# Patient Record
Sex: FEMALE
[De-identification: names, ages, dates, MRNs, and addresses within clinical notes are randomized per-mention and may not be internally consistent; named-entity substitution may affect disease eponyms.]

---

## 2023-09-27 ENCOUNTER — RX ONLY (OUTPATIENT)
Age: 48
Setting detail: RX ONLY
End: 2023-09-27

## 2023-11-27 ENCOUNTER — APPOINTMENT (RX ONLY)
Dept: URBAN - METROPOLITAN AREA CLINIC 129 | Facility: CLINIC | Age: 48
Setting detail: DERMATOLOGY
End: 2023-11-27

## 2023-11-27 DIAGNOSIS — L30.9 DERMATITIS, UNSPECIFIED: ICD-10-CM

## 2023-11-27 PROCEDURE — ? PRESCRIPTION

## 2023-11-27 PROCEDURE — 99203 OFFICE O/P NEW LOW 30 MIN: CPT

## 2023-11-27 PROCEDURE — ? COUNSELING

## 2023-11-27 RX ORDER — DESONIDE 0.5 MG/G
CREAM TOPICAL BID
Qty: 60 | Refills: 5 | Status: ERX | COMMUNITY
Start: 2023-11-27

## 2023-11-27 RX ADMIN — DESONIDE: 0.5 CREAM TOPICAL at 00:00

## 2023-11-27 ASSESSMENT — LOCATION DETAILED DESCRIPTION DERM
LOCATION DETAILED: LEFT SUPERIOR MEDIAL FOREHEAD
LOCATION DETAILED: RIGHT INFERIOR CENTRAL MALAR CHEEK
LOCATION DETAILED: LEFT INFERIOR CENTRAL MALAR CHEEK

## 2023-11-27 ASSESSMENT — LOCATION SIMPLE DESCRIPTION DERM
LOCATION SIMPLE: RIGHT CHEEK
LOCATION SIMPLE: LEFT CHEEK
LOCATION SIMPLE: LEFT FOREHEAD

## 2023-11-27 ASSESSMENT — LOCATION ZONE DERM: LOCATION ZONE: FACE

## 2023-12-04 ENCOUNTER — APPOINTMENT (RX ONLY)
Dept: URBAN - METROPOLITAN AREA CLINIC 129 | Facility: CLINIC | Age: 48
Setting detail: DERMATOLOGY
End: 2023-12-04

## 2023-12-04 DIAGNOSIS — L25.1 UNSPECIFIED CONTACT DERMATITIS DUE TO DRUGS IN CONTACT WITH SKIN: ICD-10-CM

## 2023-12-04 DIAGNOSIS — L23.9 ALLERGIC CONTACT DERMATITIS, UNSPECIFIED CAUSE: ICD-10-CM

## 2023-12-04 PROCEDURE — ? PATCH TESTING

## 2023-12-04 PROCEDURE — 95044 PATCH/APPLICATION TESTS: CPT

## 2023-12-04 ASSESSMENT — LOCATION ZONE DERM: LOCATION ZONE: TRUNK

## 2023-12-04 ASSESSMENT — LOCATION DETAILED DESCRIPTION DERM: LOCATION DETAILED: INFERIOR THORACIC SPINE

## 2023-12-04 ASSESSMENT — LOCATION SIMPLE DESCRIPTION DERM: LOCATION SIMPLE: UPPER BACK

## 2023-12-04 NOTE — PROCEDURE: PATCH TESTING
Problem: Pain:  Goal: Pain level will decrease  Description: Pain level will decrease  Outcome: Ongoing  Goal: Control of acute pain  Description: Control of acute pain  Outcome: Ongoing  Goal: Control of chronic pain  Description: Control of chronic pain  Outcome: Ongoing     Problem:  Bowel/Gastric:  Goal: Bowel function will improve  Description: Bowel function will improve  Outcome: Ongoing  Goal: Occurrences of vomiting will decrease  Description: Occurrences of vomiting will decrease  Outcome: Ongoing     Problem: Fluid Volume:  Goal: Maintenance of adequate hydration will improve  Description: Maintenance of adequate hydration will improve  Outcome: Ongoing
Number Of Patches (Maximum Allowable Per Dos By Cms Is 90): 80
Detail Level: None
Post-Care Instructions: I reviewed with the patient in detail post-care instructions. Patient should not sweat, pick at, or get the patches wet for 48 hours.
Consent: Written consent obtained, risks reviewed including but not limited to rash, itching, allergic reaction, systemic rash, remote possiblity of anaphylaxis to allergen.

## 2023-12-04 NOTE — HPI: TESTING (PATCH TESTING)
Has Your Rash Been Biopsied Before?: has not been biopsied previously
previous_patch_test_[place]_series
How Severe Is Your Rash At Its Worst?: moderate

## 2023-12-06 ENCOUNTER — APPOINTMENT (RX ONLY)
Dept: URBAN - METROPOLITAN AREA CLINIC 129 | Facility: CLINIC | Age: 48
Setting detail: DERMATOLOGY
End: 2023-12-06

## 2023-12-06 DIAGNOSIS — L23.9 ALLERGIC CONTACT DERMATITIS, UNSPECIFIED CAUSE: ICD-10-CM

## 2023-12-06 PROCEDURE — 99212 OFFICE O/P EST SF 10 MIN: CPT

## 2023-12-06 PROCEDURE — ? CORE ACDS PATCH TEST READING

## 2023-12-06 PROCEDURE — ? COUNSELING

## 2023-12-06 NOTE — PROCEDURE: CORE ACDS PATCH TEST READING
Name Of Allergen 49: D/L-?-Tocopherol 100%
Name Of Allergen 80: Benzyl alcohol 10% soft
Allergen 39 Reaction: no reaction
Name Of Allergen 54: Chloroxylenol (PCMX) 1% pet
Name Of Allergen 62: Sodium benzoate 5% pet
Name Of Allergen 6: Fragrance mix I 8% pet
Name Of Allergen 14: Epoxy resin 1% pet
Name Of Allergen 22: Mercapto mix 1% pet
Name Of Allergen 1: Nickel sulfate 2.5% pet
What Reading Time Point?: 48 hour
Name Of Allergen 64: Jany 2% pet
Name Of Allergen 40: Cocamidopropyl betaine 1% aq
Name Of Allergen 19: Methyldibromoglutaronitrile 0.5% pet
Name Of Allergen 27: Tixocortol-21-pivalate
Name Of Allergen 69: Cetyl steryl alcohol 20% pet
Name Of Allergen 46: Methyl methacrylate 2% pet
Name Of Allergen 37: Propylene glycol 100% aq
Name Of Allergen 77: Benzoic acid 5% pet
Name Of Allergen 59: 4-Chloro-3-cresol ([facility]) 1% pet
Name Of Allergen 24: Thiuram mix 1% pet
Name Of Allergen 32: Mercaptobenzothiazole 1% pet
Name Of Allergen 11: Ethylenediamine dihydrochloride 1% pet
Name Of Allergen 60: Benzalkonium chloride 0.1% aq
Name Of Allergen 7: Colophony 20% pet
Name Of Allergen 70: Ethylhexylglycerin 5% pet
Name Of Allergen 38: Iodopropynyl butylcarbamate 0.1% pet
Name Of Allergen 4: Potassium dichromate 0.25% pet
Name Of Allergen 12: Cobalt chloride 1% pet
Name Of Allergen 20: p-Phenylenediamine 1% pet
Number Of Patches Read: 80
Name Of Allergen 52: Chlorhexidine digluconate 0.5% aq
Name Of Allergen 30: Budesonide 0.1% pet
Name Of Allergen 25: Diazolidinyl urea 1% pet
Name Of Allergen 75: Phenoxyethanol 1% pet
Name Of Allergen 17: Tetracaine hydrochloride 5% pet
Name Of Allergen 35: Disperse blue 106/124 mix 1% pet
Name Of Allergen 44: Oleamidopropyl dimethylamine 0.1% aq
Name Of Allergen 57: Sesquiterpene lactone mix 0.1% pet
Name Of Allergen 72: Clobetasol-17-propionate 1% pet
Name Of Allergen 9: Methylisothiazolinone 0.2% aq
Name Of Allergen 67: Sorbitan sesquioleate 20% pet
Name Of Allergen 26: Benzocaine 5% pet
Name Of Allergen 76: Disperse Orange 3 1% pet
Name Of Allergen 58: Cocamide TODD 0.5% pet
Name Of Allergen 50: Ethyl acrylate 0.1% pet
Name Of Allergen 10: Balsam of [place] 25% pet
Name Of Allergen 68: n,n-Diphenylguanidine 1% pet
Name Of Allergen 36: Lidocaine 15% pet
Name Of Allergen 31: Hydrocortisone-17-butyrate 1% pet
Name Of Allergen 45: Decyl glucoside 5% pet
Name Of Allergen 23: 2-Bromo-2-nitropropane-1,3-diol 0.5% pet
Name Of Allergen 41: Mixed dialkyl thioureas 1% pet
Name Of Allergen 65: Compositae mix II 5% pet
Name Of Allergen 2: Lanolin alcohol (Amerchol 101) 50% pet
Name Of Allergen 42: 3-(Dimethylamino)-propylamine 1% aq
Name Of Allergen 55: 2-Hydroxy-4-methoxybenzophenone (benzophenone-3) 10% pet
Name Of Allergen 47: Lavender absolute 2% pet
Name Of Allergen 78: 2,6-Ditert-butyl-4-cresol (BHT) 2% pet
Name Of Allergen 73: Amidoamine 0.1% aq
Name Of Allergen 15: Carba mix 3% pet
Detail Level: Zone
Name Of Allergen 28: Gold sodium thiosulfate 2% pet
Name Of Allergen 33: Bacitracin 20% pet
Name Of Allergen 3: Neomycin 20% pet
Name Of Allergen 51: Tea tree oil 5% pet
Name Of Allergen 16: Black rubber mix 0.6% pet
Name Of Allergen 66: Ethyleneurea melamine-formaldehyde 5% pet
Name Of Allergen 61: 2-Hydroxy-4-methoxybenzophenone-5-sulfonic acid (benzophenone-4) 2% pet
Name Of Allergen 43: Hydroxyethyl methacrylate 2% pet
Name Of Allergen 74: Ethyl cyanoacrylate 10% pet
Name Of Allergen 56: Tosylamide formaldehyde resin 10% pet
Name Of Allergen 13: s-rqgb-[id number] formaldehyde resin 1% pet
Name Of Allergen 29: Imidazolidinyl urea 2% pet
Show Negative Results In The Note?: Yes
Name Of Allergen 8: Paraben mix 12% pet
Name Of Allergen 21: Formaldehyde 2% aq
Name Of Allergen 34: Fragrance mix II 14% pet
Name Of Allergen 79: 2-Ethylhexyl-4-methoxycinnamate 10% pet
Name Of Allergen 63: Sorbic acid 2% pet
Name Of Allergen 48: Cinnamic aldehyde 1% pet
Name Of Allergen 18: Quaternium 15 2% pet
Name Of Allergen 5: DMDM hydantoin 1% pet
Name Of Allergen 71: Triamcinolone 1% pet
Show Allergen Counseling In The Note?: No
Name Of Allergen 39: Polymyxin B sulfate 3% pet
Name Of Allergen 53: Propolis 10% pet

## 2023-12-08 ENCOUNTER — APPOINTMENT (RX ONLY)
Dept: URBAN - METROPOLITAN AREA CLINIC 129 | Facility: CLINIC | Age: 48
Setting detail: DERMATOLOGY
End: 2023-12-08

## 2023-12-08 DIAGNOSIS — L23.9 ALLERGIC CONTACT DERMATITIS, UNSPECIFIED CAUSE: ICD-10-CM

## 2023-12-08 PROCEDURE — ? COUNSELING

## 2023-12-08 PROCEDURE — ? CORE ACDS PATCH TEST READING

## 2023-12-08 PROCEDURE — 99214 OFFICE O/P EST MOD 30 MIN: CPT

## 2023-12-08 NOTE — PROCEDURE: CORE ACDS PATCH TEST READING
Name Of Allergen 61: 2-Hydroxy-4-methoxybenzophenone-5-sulfonic acid (benzophenone-4) 2% pet
Allergen 50 Reaction: no reaction
Name Of Allergen 56: Tosylamide formaldehyde resin 10% pet
Name Of Allergen 3: Neomycin 20% pet
Name Of Allergen 46: Methyl methacrylate 2% pet
Name Of Allergen 21: Formaldehyde 2% aq
Name Of Allergen 16: Black rubber mix 0.6% pet
What Reading Time Point?: 48 hour
Name Of Allergen 74: Ethyl cyanoacrylate 10% pet
Allergen 29 Reaction: irritant reaction
Name Of Allergen 66: Ethyleneurea melamine-formaldehyde 5% pet
Name Of Allergen 51: Tea tree oil 5% pet
Name Of Allergen 39: Polymyxin B sulfate 3% pet
Name Of Allergen 79: 2-Ethylhexyl-4-methoxycinnamate 10% pet
Detail Level: Zone
Name Of Allergen 13: j-[id number]-[id number] formaldehyde resin 1% pet
Name Of Allergen 26: Benzocaine 5% pet
Name Of Allergen 8: Paraben mix 12% pet
Name Of Allergen 5: DMDM hydantoin 1% pet
Number Of Patches Read: 80
Name Of Allergen 36: Lidocaine 15% pet
Name Of Allergen 18: Quaternium 15 2% pet
Name Of Allergen 71: Triamcinolone 1% pet
Name Of Allergen 31: Hydrocortisone-17-butyrate 1% pet
Name Of Allergen 58: Cocamide TODD 0.5% pet
Name Of Allergen 23: 2-Bromo-2-nitropropane-1,3-diol 0.5% pet
Name Of Allergen 63: Sorbic acid 2% pet
Name Of Allergen 48: Cinnamic aldehyde 1% pet
Name Of Allergen 27: Tixocortol-21-pivalate
Name Of Allergen 67: Sorbitan sesquioleate 20% pet
Name Of Allergen 6: Fragrance mix I 8% pet
Name Of Allergen 1: Nickel sulfate 2.5% pet
Show Allergen Counseling In The Note?: No
Name Of Allergen 44: Oleamidopropyl dimethylamine 0.1% aq
Name Of Allergen 32: Mercaptobenzothiazole 1% pet
Name Of Allergen 37: Propylene glycol 100% aq
Name Of Allergen 14: Epoxy resin 1% pet
Name Of Allergen 49: D/L-?-Tocopherol 100%
Name Of Allergen 64: Jany 2% pet
Name Of Allergen 24: Thiuram mix 1% pet
Name Of Allergen 54: Chloroxylenol (PCMX) 1% pet
Name Of Allergen 59: 4-Chloro-3-cresol ([facility]) 1% pet
Name Of Allergen 19: Methyldibromoglutaronitrile 0.5% pet
Name Of Allergen 77: Benzoic acid 5% pet
Name Of Allergen 69: Cetyl steryl alcohol 20% pet
Name Of Allergen 42: 3-(Dimethylamino)-propylamine 1% aq
Name Of Allergen 34: Fragrance mix II 14% pet
Name Of Allergen 11: Ethylenediamine dihydrochloride 1% pet
Name Of Allergen 29: Imidazolidinyl urea 2% pet
Name Of Allergen 30: Budesonide 0.1% pet
Name Of Allergen 70: Ethylhexylglycerin 5% pet
Name Of Allergen 25: Diazolidinyl urea 1% pet
Name Of Allergen 43: Hydroxyethyl methacrylate 2% pet
Name Of Allergen 4: Potassium dichromate 0.25% pet
Name Of Allergen 47: Lavender absolute 2% pet
Name Of Allergen 35: Disperse blue 106/124 mix 1% pet
Name Of Allergen 12: Cobalt chloride 1% pet
Name Of Allergen 57: Sesquiterpene lactone mix 0.1% pet
Name Of Allergen 17: Tetracaine hydrochloride 5% pet
Name Of Allergen 75: Phenoxyethanol 1% pet
Name Of Allergen 62: Sodium benzoate 5% pet
Name Of Allergen 52: Chlorhexidine digluconate 0.5% aq
Name Of Allergen 22: Mercapto mix 1% pet
Name Of Allergen 80: Benzyl alcohol 10% soft
Name Of Allergen 40: Cocamidopropyl betaine 1% aq
Show Negative Results In The Note?: Yes
Name Of Allergen 72: Clobetasol-17-propionate 1% pet
Name Of Allergen 9: Methylisothiazolinone 0.2% aq
Name Of Allergen 41: Mixed dialkyl thioureas 1% pet
Name Of Allergen 76: Disperse Orange 3 1% pet
Name Of Allergen 53: Propolis 10% pet
Name Of Allergen 28: Gold sodium thiosulfate 2% pet
Name Of Allergen 33: Bacitracin 20% pet
Name Of Allergen 10: Balsam of [place] 25% pet
Name Of Allergen 68: n,n-Diphenylguanidine 1% pet
Name Of Allergen 50: Ethyl acrylate 0.1% pet
Name Of Allergen 55: 2-Hydroxy-4-methoxybenzophenone (benzophenone-3) 10% pet
Name Of Allergen 45: Decyl glucoside 5% pet
Name Of Allergen 2: Lanolin alcohol (Amerchol 101) 50% pet
Name Of Allergen 38: Iodopropynyl butylcarbamate 0.1% pet
Name Of Allergen 15: Carba mix 3% pet
Name Of Allergen 78: 2,6-Ditert-butyl-4-cresol (BHT) 2% pet
Name Of Allergen 73: Amidoamine 0.1% aq
Name Of Allergen 65: Compositae mix II 5% pet
Name Of Allergen 7: Colophony 20% pet
Name Of Allergen 60: Benzalkonium chloride 0.1% aq
Name Of Allergen 20: p-Phenylenediamine 1% pet

## 2024-01-08 ENCOUNTER — APPOINTMENT (RX ONLY)
Dept: URBAN - METROPOLITAN AREA CLINIC 129 | Facility: CLINIC | Age: 49
Setting detail: DERMATOLOGY
End: 2024-01-08

## 2024-01-08 DIAGNOSIS — L25.9 UNSPECIFIED CONTACT DERMATITIS, UNSPECIFIED CAUSE: ICD-10-CM

## 2024-01-08 PROCEDURE — ? COUNSELING

## 2024-01-08 PROCEDURE — ? PRESCRIPTION

## 2024-01-08 PROCEDURE — ? TREATMENT REGIMEN

## 2024-01-08 PROCEDURE — 99213 OFFICE O/P EST LOW 20 MIN: CPT

## 2024-01-08 RX ORDER — PREDNISONE 20 MG/1
TABLET ORAL
Qty: 14 | Refills: 0 | Status: ERX | COMMUNITY
Start: 2024-01-08

## 2024-01-08 RX ADMIN — PREDNISONE: 20 TABLET ORAL at 00:00

## 2024-01-08 ASSESSMENT — LOCATION ZONE DERM: LOCATION ZONE: FACE

## 2024-01-08 ASSESSMENT — LOCATION DETAILED DESCRIPTION DERM
LOCATION DETAILED: LEFT INFERIOR CENTRAL MALAR CHEEK
LOCATION DETAILED: RIGHT INFERIOR CENTRAL MALAR CHEEK

## 2024-01-08 ASSESSMENT — LOCATION SIMPLE DESCRIPTION DERM
LOCATION SIMPLE: RIGHT CHEEK
LOCATION SIMPLE: LEFT CHEEK

## 2024-01-22 ENCOUNTER — APPOINTMENT (RX ONLY)
Dept: URBAN - METROPOLITAN AREA CLINIC 129 | Facility: CLINIC | Age: 49
Setting detail: DERMATOLOGY
End: 2024-01-22

## 2024-01-22 DIAGNOSIS — L25.9 UNSPECIFIED CONTACT DERMATITIS, UNSPECIFIED CAUSE: ICD-10-CM

## 2024-01-22 PROCEDURE — ? PRESCRIPTION

## 2024-01-22 PROCEDURE — 99213 OFFICE O/P EST LOW 20 MIN: CPT

## 2024-01-22 PROCEDURE — ? COUNSELING

## 2024-01-22 RX ORDER — TRIAMCINOLONE ACETONIDE 1 MG/G
OINTMENT TOPICAL
Qty: 30 | Refills: 1 | Status: ERX | COMMUNITY
Start: 2024-01-22

## 2024-01-22 RX ADMIN — TRIAMCINOLONE ACETONIDE: 1 OINTMENT TOPICAL at 00:00

## 2024-01-22 ASSESSMENT — LOCATION SIMPLE DESCRIPTION DERM
LOCATION SIMPLE: LEFT CHEEK
LOCATION SIMPLE: RIGHT CHEEK

## 2024-01-22 ASSESSMENT — LOCATION ZONE DERM: LOCATION ZONE: FACE

## 2024-02-06 ENCOUNTER — APPOINTMENT (RX ONLY)
Dept: URBAN - METROPOLITAN AREA CLINIC 129 | Facility: CLINIC | Age: 49
Setting detail: DERMATOLOGY
End: 2024-02-06

## 2024-02-06 DIAGNOSIS — L25.9 UNSPECIFIED CONTACT DERMATITIS, UNSPECIFIED CAUSE: ICD-10-CM

## 2024-02-06 PROCEDURE — ? PRESCRIPTION

## 2024-02-06 PROCEDURE — ? COUNSELING

## 2024-02-06 PROCEDURE — 99213 OFFICE O/P EST LOW 20 MIN: CPT

## 2024-02-06 PROCEDURE — ? TREATMENT REGIMEN

## 2024-02-06 RX ORDER — MYCOPHENOLATE MOFETIL 500 MG/1
TABLET ORAL
Qty: 60 | Refills: 0 | Status: ERX | COMMUNITY
Start: 2024-02-06

## 2024-02-06 RX ADMIN — MYCOPHENOLATE MOFETIL: 500 TABLET ORAL at 00:00

## 2024-02-06 ASSESSMENT — LOCATION DETAILED DESCRIPTION DERM
LOCATION DETAILED: RIGHT INFERIOR CENTRAL MALAR CHEEK
LOCATION DETAILED: LEFT INFERIOR CENTRAL MALAR CHEEK

## 2024-02-06 ASSESSMENT — LOCATION SIMPLE DESCRIPTION DERM
LOCATION SIMPLE: LEFT CHEEK
LOCATION SIMPLE: RIGHT CHEEK

## 2024-02-06 ASSESSMENT — LOCATION ZONE DERM: LOCATION ZONE: FACE

## 2024-02-06 NOTE — PROCEDURE: TREATMENT REGIMEN
Initiate Treatment: mycophenolate mofetil 500 mg tablet
Detail Level: Zone
Plan: Mycophenolate  500 mg. waiting on patient to bring bloodwork.
Continue Regimen: triamcinolone acetonide 0.1 % topical ointment

## 2024-03-06 ENCOUNTER — RX ONLY (OUTPATIENT)
Age: 49
Setting detail: RX ONLY
End: 2024-03-06

## 2024-03-06 RX ORDER — MYCOPHENOLATE MOFETIL 500 MG/1
TABLET ORAL
Qty: 60 | Refills: 0

## 2024-03-12 ENCOUNTER — APPOINTMENT (RX ONLY)
Dept: URBAN - METROPOLITAN AREA CLINIC 129 | Facility: CLINIC | Age: 49
Setting detail: DERMATOLOGY
End: 2024-03-12

## 2024-03-12 DIAGNOSIS — L71.0 PERIORAL DERMATITIS: ICD-10-CM

## 2024-03-12 PROCEDURE — ? COUNSELING

## 2024-03-12 PROCEDURE — ? PRESCRIPTION

## 2024-03-12 PROCEDURE — 99213 OFFICE O/P EST LOW 20 MIN: CPT

## 2024-03-12 RX ORDER — DOXYCYCLINE 100 MG/1
CAPSULE ORAL QD
Qty: 30 | Refills: 3 | Status: ERX | COMMUNITY
Start: 2024-03-12

## 2024-03-12 RX ORDER — RUXOLITINIB 15 MG/G
CREAM TOPICAL
Qty: 60 | Refills: 8 | Status: ERX | COMMUNITY
Start: 2024-03-12

## 2024-03-12 RX ADMIN — RUXOLITINIB: 15 CREAM TOPICAL at 00:00

## 2024-03-12 RX ADMIN — DOXYCYCLINE: 100 CAPSULE ORAL at 00:00

## 2024-03-12 ASSESSMENT — LOCATION ZONE DERM: LOCATION ZONE: FACE

## 2024-03-12 ASSESSMENT — LOCATION DETAILED DESCRIPTION DERM
LOCATION DETAILED: LEFT INFERIOR MEDIAL MALAR CHEEK
LOCATION DETAILED: RIGHT SUPERIOR MEDIAL BUCCAL CHEEK

## 2024-03-12 ASSESSMENT — LOCATION SIMPLE DESCRIPTION DERM
LOCATION SIMPLE: RIGHT CHEEK
LOCATION SIMPLE: LEFT CHEEK

## 2024-05-13 ENCOUNTER — APPOINTMENT (RX ONLY)
Dept: URBAN - METROPOLITAN AREA CLINIC 129 | Facility: CLINIC | Age: 49
Setting detail: DERMATOLOGY
End: 2024-05-13

## 2024-05-13 DIAGNOSIS — L71.0 PERIORAL DERMATITIS: ICD-10-CM | Status: WELL CONTROLLED

## 2024-05-13 DIAGNOSIS — L21.8 OTHER SEBORRHEIC DERMATITIS: ICD-10-CM

## 2024-05-13 PROCEDURE — ? TREATMENT REGIMEN

## 2024-05-13 PROCEDURE — 99214 OFFICE O/P EST MOD 30 MIN: CPT

## 2024-05-13 PROCEDURE — ? COUNSELING

## 2024-05-13 ASSESSMENT — LOCATION SIMPLE DESCRIPTION DERM
LOCATION SIMPLE: POSTERIOR SCALP
LOCATION SIMPLE: RIGHT CHEEK
LOCATION SIMPLE: LEFT CHEEK

## 2024-05-13 ASSESSMENT — LOCATION ZONE DERM
LOCATION ZONE: FACE
LOCATION ZONE: SCALP

## 2024-05-13 ASSESSMENT — LOCATION DETAILED DESCRIPTION DERM
LOCATION DETAILED: LEFT INFERIOR MEDIAL MALAR CHEEK
LOCATION DETAILED: RIGHT SUPERIOR MEDIAL BUCCAL CHEEK
LOCATION DETAILED: POSTERIOR MID-PARIETAL SCALP

## 2024-05-13 NOTE — PROCEDURE: TREATMENT REGIMEN
Detail Level: Zone
Plan: PRN Doxycycline and Opzelura for flares
Continue Regimen: CeraVe healing ointment
Otc Regimen: Head&shoulders